# Patient Record
Sex: FEMALE | Race: WHITE | Employment: OTHER | URBAN - METROPOLITAN AREA
[De-identification: names, ages, dates, MRNs, and addresses within clinical notes are randomized per-mention and may not be internally consistent; named-entity substitution may affect disease eponyms.]

---

## 2019-01-25 ENCOUNTER — TELEPHONE (OUTPATIENT)
Dept: PRIMARY CARE CLINIC | Age: 29
End: 2019-01-25

## 2019-01-25 ENCOUNTER — OFFICE VISIT (OUTPATIENT)
Dept: PRIMARY CARE CLINIC | Age: 29
End: 2019-01-25

## 2019-01-25 VITALS
DIASTOLIC BLOOD PRESSURE: 100 MMHG | BODY MASS INDEX: 34.55 KG/M2 | SYSTOLIC BLOOD PRESSURE: 135 MMHG | WEIGHT: 176 LBS | TEMPERATURE: 97.8 F | HEART RATE: 82 BPM | HEIGHT: 60 IN

## 2019-01-25 DIAGNOSIS — J30.9 ALLERGIC RHINITIS, UNSPECIFIED SEASONALITY, UNSPECIFIED TRIGGER: ICD-10-CM

## 2019-01-25 DIAGNOSIS — R03.0 ELEVATED BP WITHOUT DIAGNOSIS OF HYPERTENSION: ICD-10-CM

## 2019-01-25 DIAGNOSIS — H53.8 BLURRED VISION: ICD-10-CM

## 2019-01-25 DIAGNOSIS — F33.1 MODERATE EPISODE OF RECURRENT MAJOR DEPRESSIVE DISORDER (HCC): Primary | ICD-10-CM

## 2019-01-25 DIAGNOSIS — K58.2 IRRITABLE BOWEL SYNDROME WITH BOTH CONSTIPATION AND DIARRHEA: ICD-10-CM

## 2019-01-25 DIAGNOSIS — G43.001 MIGRAINE WITHOUT AURA AND WITH STATUS MIGRAINOSUS, NOT INTRACTABLE: ICD-10-CM

## 2019-01-25 PROCEDURE — 99203 OFFICE O/P NEW LOW 30 MIN: CPT | Performed by: INTERNAL MEDICINE

## 2019-01-25 RX ORDER — ESCITALOPRAM OXALATE 20 MG/1
20 TABLET ORAL DAILY
Qty: 30 TABLET | Refills: 3 | Status: SHIPPED | OUTPATIENT
Start: 2019-01-25 | End: 2019-06-04 | Stop reason: SDUPTHER

## 2019-01-25 RX ORDER — MONTELUKAST SODIUM 10 MG/1
10 TABLET ORAL NIGHTLY
COMMUNITY
End: 2019-01-25 | Stop reason: SDUPTHER

## 2019-01-25 RX ORDER — DICYCLOMINE HCL 20 MG
20 TABLET ORAL EVERY 6 HOURS PRN
Qty: 120 TABLET | Refills: 3 | Status: SHIPPED | OUTPATIENT
Start: 2019-01-25

## 2019-01-25 RX ORDER — MONTELUKAST SODIUM 10 MG/1
10 TABLET ORAL NIGHTLY
Qty: 30 TABLET | Refills: 5 | Status: SHIPPED | OUTPATIENT
Start: 2019-01-25 | End: 2019-12-18 | Stop reason: SDUPTHER

## 2019-01-25 RX ORDER — ZOLMITRIPTAN 5 MG/1
5 TABLET, FILM COATED ORAL PRN
Qty: 6 TABLET | Refills: 5 | Status: SHIPPED | OUTPATIENT
Start: 2019-01-25 | End: 2019-04-25 | Stop reason: ALTCHOICE

## 2019-01-25 ASSESSMENT — ENCOUNTER SYMPTOMS
DIARRHEA: 1
CONSTIPATION: 1

## 2019-01-26 DIAGNOSIS — R03.0 ELEVATED BP WITHOUT DIAGNOSIS OF HYPERTENSION: ICD-10-CM

## 2019-01-26 LAB
A/G RATIO: 1.4 (ref 1.1–2.2)
ALBUMIN SERPL-MCNC: 4.7 G/DL (ref 3.4–5)
ALP BLD-CCNC: 69 U/L (ref 40–129)
ALT SERPL-CCNC: 24 U/L (ref 10–40)
ANION GAP SERPL CALCULATED.3IONS-SCNC: 15 MMOL/L (ref 3–16)
AST SERPL-CCNC: 20 U/L (ref 15–37)
BASOPHILS ABSOLUTE: 0 K/UL (ref 0–0.2)
BASOPHILS RELATIVE PERCENT: 0.3 %
BILIRUB SERPL-MCNC: 0.5 MG/DL (ref 0–1)
BILIRUBIN URINE: NEGATIVE
BLOOD, URINE: NEGATIVE
BUN BLDV-MCNC: 9 MG/DL (ref 7–20)
CALCIUM SERPL-MCNC: 9.9 MG/DL (ref 8.3–10.6)
CHLORIDE BLD-SCNC: 101 MMOL/L (ref 99–110)
CHOLESTEROL, TOTAL: 252 MG/DL (ref 0–199)
CLARITY: ABNORMAL
CO2: 22 MMOL/L (ref 21–32)
COLOR: YELLOW
CREAT SERPL-MCNC: 0.7 MG/DL (ref 0.6–1.1)
EOSINOPHILS ABSOLUTE: 0.3 K/UL (ref 0–0.6)
EOSINOPHILS RELATIVE PERCENT: 4 %
EPITHELIAL CELLS, UA: 8 /HPF (ref 0–5)
GFR AFRICAN AMERICAN: >60
GFR NON-AFRICAN AMERICAN: >60
GLOBULIN: 3.3 G/DL
GLUCOSE BLD-MCNC: 102 MG/DL (ref 70–99)
GLUCOSE URINE: NEGATIVE MG/DL
HCT VFR BLD CALC: 43.9 % (ref 36–48)
HDLC SERPL-MCNC: 38 MG/DL (ref 40–60)
HEMOGLOBIN: 14.8 G/DL (ref 12–16)
HYALINE CASTS: 2 /LPF (ref 0–8)
KETONES, URINE: NEGATIVE MG/DL
LDL CHOLESTEROL CALCULATED: 177 MG/DL
LEUKOCYTE ESTERASE, URINE: NEGATIVE
LYMPHOCYTES ABSOLUTE: 2.7 K/UL (ref 1–5.1)
LYMPHOCYTES RELATIVE PERCENT: 42.5 %
MCH RBC QN AUTO: 31.7 PG (ref 26–34)
MCHC RBC AUTO-ENTMCNC: 33.7 G/DL (ref 31–36)
MCV RBC AUTO: 94.1 FL (ref 80–100)
MICROSCOPIC EXAMINATION: YES
MONOCYTES ABSOLUTE: 0.5 K/UL (ref 0–1.3)
MONOCYTES RELATIVE PERCENT: 8.3 %
NEUTROPHILS ABSOLUTE: 2.9 K/UL (ref 1.7–7.7)
NEUTROPHILS RELATIVE PERCENT: 44.9 %
NITRITE, URINE: NEGATIVE
PDW BLD-RTO: 13.9 % (ref 12.4–15.4)
PH UA: 5
PLATELET # BLD: 372 K/UL (ref 135–450)
PMV BLD AUTO: 8.7 FL (ref 5–10.5)
POTASSIUM SERPL-SCNC: 4.4 MMOL/L (ref 3.5–5.1)
PROTEIN UA: ABNORMAL MG/DL
RBC # BLD: 4.67 M/UL (ref 4–5.2)
RBC UA: 3 /HPF (ref 0–4)
SODIUM BLD-SCNC: 138 MMOL/L (ref 136–145)
SPECIFIC GRAVITY UA: >1.03
TOTAL PROTEIN: 8 G/DL (ref 6.4–8.2)
TRIGL SERPL-MCNC: 187 MG/DL (ref 0–150)
TSH SERPL DL<=0.05 MIU/L-ACNC: 1.89 UIU/ML (ref 0.27–4.2)
URINE TYPE: ABNORMAL
UROBILINOGEN, URINE: 0.2 E.U./DL
VLDLC SERPL CALC-MCNC: 37 MG/DL
WBC # BLD: 6.5 K/UL (ref 4–11)
WBC UA: 5 /HPF (ref 0–5)

## 2019-02-21 ENCOUNTER — TELEPHONE (OUTPATIENT)
Dept: PRIMARY CARE CLINIC | Age: 29
End: 2019-02-21

## 2019-02-26 ENCOUNTER — TELEPHONE (OUTPATIENT)
Dept: PRIMARY CARE CLINIC | Age: 29
End: 2019-02-26

## 2019-04-25 ENCOUNTER — OFFICE VISIT (OUTPATIENT)
Dept: PRIMARY CARE CLINIC | Age: 29
End: 2019-04-25

## 2019-04-25 VITALS
SYSTOLIC BLOOD PRESSURE: 130 MMHG | DIASTOLIC BLOOD PRESSURE: 89 MMHG | OXYGEN SATURATION: 98 % | WEIGHT: 176 LBS | BODY MASS INDEX: 34.55 KG/M2 | TEMPERATURE: 98.2 F | HEART RATE: 73 BPM | HEIGHT: 60 IN

## 2019-04-25 DIAGNOSIS — J45.40 MODERATE PERSISTENT ASTHMA WITHOUT COMPLICATION: ICD-10-CM

## 2019-04-25 DIAGNOSIS — G43.001 MIGRAINE WITHOUT AURA AND WITH STATUS MIGRAINOSUS, NOT INTRACTABLE: Primary | ICD-10-CM

## 2019-04-25 DIAGNOSIS — F33.1 MODERATE EPISODE OF RECURRENT MAJOR DEPRESSIVE DISORDER (HCC): ICD-10-CM

## 2019-04-25 PROCEDURE — 99214 OFFICE O/P EST MOD 30 MIN: CPT | Performed by: INTERNAL MEDICINE

## 2019-04-25 RX ORDER — DICLOFENAC POTASSIUM 50 MG/1
TABLET, FILM COATED ORAL
Qty: 90 TABLET | Refills: 3 | Status: SHIPPED | OUTPATIENT
Start: 2019-04-25

## 2019-04-25 RX ORDER — TOPIRAMATE 50 MG/1
50 TABLET, FILM COATED ORAL 2 TIMES DAILY
Qty: 60 TABLET | Refills: 3 | Status: SHIPPED | OUTPATIENT
Start: 2019-04-25 | End: 2021-09-15 | Stop reason: ALTCHOICE

## 2019-04-25 ASSESSMENT — ENCOUNTER SYMPTOMS
RESPIRATORY NEGATIVE: 1
EYES NEGATIVE: 1
GASTROINTESTINAL NEGATIVE: 1

## 2019-04-25 NOTE — PATIENT INSTRUCTIONS
Start on the Advair Diskus daily as directed for treatment of asthma and to spare use of the rescue inhaler. Continue Lexapro for depression at the current dose. Start on Topamax 50 mg twice a day for migraines and see the neurologist migraine specialist, Dr. Farideh Dixon if no relief. Dicofenac 50 mg up to 3 times a day at the first sign of headache may also help. Patient Education        Migraine Headache: Care Instructions  Your Care Instructions  Migraines are painful, throbbing headaches that often start on one side of the head. They may cause nausea and vomiting and make you sensitive to light, sound, or smell. Without treatment, migraines can last from 4 hours to a few days. Medicines can help prevent migraines or stop them after they have started. Your doctor can help you find which ones work best for you. Follow-up care is a key part of your treatment and safety. Be sure to make and go to all appointments, and call your doctor if you are having problems. It's also a good idea to know your test results and keep a list of the medicines you take. How can you care for yourself at home? · Do not drive if you have taken a prescription pain medicine. · Rest in a quiet, dark room until your headache is gone. Close your eyes, and try to relax or go to sleep. Don't watch TV or read. · Put a cold, moist cloth or cold pack on the painful area for 10 to 20 minutes at a time. Put a thin cloth between the cold pack and your skin. · Use a warm, moist towel or a heating pad set on low to relax tight shoulder and neck muscles. · Have someone gently massage your neck and shoulders. · Take your medicines exactly as prescribed. Call your doctor if you think you are having a problem with your medicine. You will get more details on the specific medicines your doctor prescribes. · Be careful not to take pain medicine more often than the instructions allow.  You could get worse or more frequent headaches when the medicine wears off. To prevent migraines  · Keep a headache diary so you can figure out what triggers your headaches. Avoiding triggers may help you prevent headaches. Record when each headache began, how long it lasted, and what the pain was like. (Was it throbbing, aching, stabbing, or dull?) Write down any other symptoms you had with the headache, such as nausea, flashing lights or dark spots, or sensitivity to bright light or loud noise. Note if the headache occurred near your period. List anything that might have triggered the headache. Triggers may include certain foods (chocolate, cheese, wine) or odors, smoke, bright light, stress, or lack of sleep. · If your doctor has prescribed medicine for your migraines, take it as directed. You may have medicine that you take only when you get a migraine and medicine that you take all the time to help prevent migraines. ? If your doctor has prescribed medicine for when you get a headache, take it at the first sign of a migraine, unless your doctor has given you other instructions. ? If your doctor has prescribed medicine to prevent migraines, take it exactly as prescribed. Call your doctor if you think you are having a problem with your medicine. · Find healthy ways to deal with stress. Migraines are most common during or right after stressful times. Take time to relax before and after you do something that has caused a migraine in the past.  · Try to keep your muscles relaxed by keeping good posture. Check your jaw, face, neck, and shoulder muscles for tension. Try to relax them. When you sit at a desk, change positions often. And make sure to stretch for 30 seconds each hour. · Get plenty of sleep and exercise. · Eat meals on a regular schedule. Avoid foods and drinks that often trigger migraines.  These include chocolate, alcohol (especially red wine and port), aspartame, monosodium glutamate (MSG), and some additives found in foods (such as hot dogs, ashton, cold cuts, aged cheeses, and pickled foods). · Limit caffeine. Don't drink too much coffee, tea, or soda. But don't quit caffeine suddenly. That can also give you migraines. · Do not smoke or allow others to smoke around you. If you need help quitting, talk to your doctor about stop-smoking programs and medicines. These can increase your chances of quitting for good. · If you are taking birth control pills or hormone therapy, talk to your doctor about whether they are triggering your migraines. When should you call for help? Call 911 anytime you think you may need emergency care. For example, call if:    · You have signs of a stroke. These may include:  ? Sudden numbness, paralysis, or weakness in your face, arm, or leg, especially on only one side of your body. ? Sudden vision changes. ? Sudden trouble speaking. ? Sudden confusion or trouble understanding simple statements. ? Sudden problems with walking or balance. ? A sudden, severe headache that is different from past headaches.    Call your doctor now or seek immediate medical care if:    · You have new or worse nausea and vomiting.     · You have a new or higher fever.     · Your headache gets much worse.    Watch closely for changes in your health, and be sure to contact your doctor if:    · You are not getting better after 2 days (48 hours). Where can you learn more? Go to https://Offeramapascualeb.Wickr. org and sign in to your [employer] account. Enter J087 in the [facility] box to learn more about \"Migraine Headache: Care Instructions. \"     If you do not have an account, please click on the \"Sign Up Now\" link. Current as of: Jackie 3, 2018  Content Version: 11.9  © 2103-7879 [employer]. Care instructions adapted under license by [facility] ([facility]).  If you have questions about a medical condition or this instruction, always ask your healthcare professional. Yariel Lopez any warranty or liability for adults. You should not use diclofenac if you are allergic to it, or if you have ever had an asthma attack or severe allergic reaction after taking aspirin or an NSAID. Do not use Cambia to treat a cluster headache. Do not use Zipsor if you are allergic to beef or beef protein. To make sure diclofenac is safe for you, tell your doctor if you have:  · heart disease, high blood pressure, high cholesterol, diabetes, or if you smoke;  · a history of heart attack, stroke, or blood clot;  · a history of stomach ulcers or bleeding;  · asthma;  · liver or kidney disease;  · fluid retention. Taking diclofenac during the last 3 months of pregnancy may harm the unborn baby. Tell your doctor if you are pregnant or plan to become pregnant. It is not known whether diclofenac passes into breast milk or if it could harm a nursing baby. You should not breast-feed while using this medicine. Diclofenac is not approved for use by anyone younger than 25years old. How should I take diclofenac? Different brands of diclofenac contain different amounts of this medicine, and may have different uses. If you switch brands, your dose needs may change. Follow your doctor's instructions about how much medicine to take. Ask your pharmacist if you have any questions about the brand of diclofenac you receive at the pharmacy. Follow all directions on your prescription label. Your doctor may occasionally change your dose. Do not take this medicine in larger amounts or for longer than recommended. Use the lowest dose that is effective in treating your condition. Take Zorvolex on an empty stomach, at least 1 hour before or 2 hours after a meal.  Do not crush, chew, or break an extended-release tablet or delayed-release tablet. Swallow it whole. Dissolve diclofenac powder (Cambia) with 1 to 2 ounces of water. Do not use any other type of liquid. Stir this mixture and drink all of it right away.  Diclofenac powder works best if you take it on an empty stomach. Call your doctor if your headache does not completely go away after taking Cambia. Do not take a second dose of diclofenac powder without your doctor's advice. Overuse of migraine headache medicine can make headaches worse. Tell your doctor if the medicine seems to stop working as well in treating your migraine attacks. If you use diclofenac long-term, you may need frequent medical tests. Store at room temperature away from moisture and heat. Keep the bottle tightly closed when not in use. Read all patient information, medication guides, and instruction sheets provided to you. Ask your doctor or pharmacist if you have any questions. What happens if I miss a dose? Take the missed dose as soon as you remember. Skip the missed dose if it is almost time for your next scheduled dose. Do not take extra medicine to make up the missed dose. What happens if I overdose? Seek emergency medical attention or call the Poison Help line at 1-725.358.8046. What should I avoid while taking diclofenac? Avoid drinking alcohol. It may increase your risk of stomach bleeding. Avoid taking aspirin or other NSAIDs while you are taking diclofenac. Ask a doctor or pharmacist before using any cold, allergy, or pain medication. Many medicines available over the counter contain aspirin or other medicines similar to diclofenac. Taking certain products together can cause you to get too much of this type of medication. Check the label to see if a medicine contains aspirin, ibuprofen, ketoprofen, or naproxen. What are the possible side effects of diclofenac? Get emergency medical help if you have signs of an allergic reaction: sneezing, runny or stuffy nose; wheezing or trouble breathing; hives; swelling of your face, lips, tongue, or throat.   Get emergency medical help if you have signs of a heart attack or stroke: chest pain spreading to your jaw or shoulder, sudden numbness or weakness on one side of the body, slurred speech, feeling short of breath. Stop using diclofenac and call your doctor at once if you have:  · the first sign of any skin rash, no matter how mild;  · shortness of breath (even with mild exertion); · swelling or rapid weight gain;  · signs of stomach bleeding --bloody or tarry stools, coughing up blood or vomit that looks like coffee grounds;  · liver problems --nausea, upper stomach pain, itching, tired feeling, flu-like symptoms, loss of appetite, dark urine, latrice-colored stools, jaundice (yellowing of the skin or eyes);  · kidney problems --little or no urinating, painful or difficult urination, swelling in your feet or ankles, feeling tired or short of breath;  · high blood pressure --severe headache, pounding in your neck or ears, nosebleed, anxiety, confusion;  · low red blood cells (anemia) --pale skin, feeling light-headed or short of breath, rapid heart rate, trouble concentrating; or  · severe skin reaction --fever, sore throat, swelling in your face or tongue, burning in your eyes, skin pain followed by a red or purple skin rash that spreads (especially in the face or upper body) and causes blistering and peeling. Common side effects may include:  · indigestion, gas, stomach pain, nausea, vomiting;  · diarrhea, constipation;  · headache, dizziness, drowsiness;  · stuffy nose;  · itching, increased sweating;  · increased blood pressure; or  · swelling or pain in your arms or legs. This is not a complete list of side effects and others may occur. Call your doctor for medical advice about side effects. You may report side effects to FDA at 3-937-FDA-8888. What other drugs will affect diclofenac? Ask your doctor before using diclofenac if you take an antidepressant such as citalopram, escitalopram, fluoxetine (Prozac), fluvoxamine, paroxetine, sertraline (Zoloft), trazodone, or vilazodone. Taking any of these medicines with an NSAID may cause you to bruise or bleed easily.   Tell your doctor about all your current medicines and any you start or stop using, especially:  · cyclosporine;  · lithium;  · methotrexate;  · rifampin;  · antifungal medicine;  · a blood thinner (warfarin, Coumadin, Jantoven);  · heart or blood pressure medication, including a diuretic or \"water pill\";  · other forms of diclofenac (Flector, Pennsaid, Solaraze, Voltaren Gel);  · other NSAIDs --aspirin, ibuprofen (Advil, Motrin), naproxen (Aleve), celecoxib (Celebrex), indomethacin, meloxicam, and others; or  · steroid medicine (prednisone and others). This list is not complete. Other drugs may interact with diclofenac, including prescription and over-the-counter medicines, vitamins, and herbal products. Not all possible interactions are listed in this medication guide. Where can I get more information? Your pharmacist can provide more information about diclofenac. Remember, keep this and all other medicines out of the reach of children, never share your medicines with others, and use this medication only for the indication prescribed. Every effort has been made to ensure that the information provided by [address] is accurate, up-to-date, and complete, but no guarantee is made to that effect. Drug information contained herein may be time sensitive. [facility] information has been compiled for use by healthcare practitioners and consumers in the [facility] and therefore [facility] does not warrant that uses outside of the [facility] are appropriate, unless specifically indicated otherwise. [facility]'s drug information does not endorse drugs, diagnose patients or recommend therapy. [facility] drug information is an informational resource designed to assist licensed healthcare practitioners in caring for their patients and/or to serve consumers viewing this service as a supplement to, and not a substitute for, the expertise, skill, knowledge and judgment of healthcare practitioners.  The absence of a warning for a given drug or drug combination in no way should be construed to indicate that the drug or drug combination is safe, effective or appropriate for any given patient. [facility] does not assume any responsibility for any aspect of healthcare administered with the aid of information [facility] provides. The information contained herein is not intended to cover all possible uses, directions, precautions, warnings, drug interactions, allergic reactions, or adverse effects. If you have questions about the drugs you are taking, check with your doctor, nurse or pharmacist.  Copyright 2117-3891 [address]: 15.01. Revision date: 3/23/2017. Care instructions adapted under license by [facility] ([facility]). If you have questions about a medical condition or this instruction, always ask your healthcare professional. [place] [address] any warranty or liability for your use of this information. Patient Education        topiramate  Pronunciation:  toe HAO a mate  Brand:  Qudexy XR Sprinkle, Topamax, Topamax Sprinkle, Trokendi XR  What is the most important information I should know about topiramate? Topiramate may cause vision problems that can be permanent if not treated quickly. Call your doctor right away if you have a sudden decrease in vision. Topiramate can decrease sweating and may cause life-threatening dehydration (especially in children). Avoid becoming overheated or dehydrated. Tell your doctor if you have decreased sweating, high fever, and hot dry skin. Do not take Trokendi XR within 6 hours of drinking alcohol. Some people have thoughts about suicide while taking seizure medicine. Stay alert to changes in your mood or symptoms. Report any new or worsening symptoms to your doctor. Do not stop using topiramate suddenly or you could have increased seizures. What is topiramate? Topiramate is a seizure medicine, also called an anticonvulsant.  Topiramate is used to treat seizures in adults and children who are at least 3years old. Trokendi XR is for use in adults and children who are at least 10years old. Extended-release topiramate has a higher minimum age (at least 8years old) when used as the child's only seizure medicine. Some brands of topiramate are also used to prevent migraine headaches in adults and teenagers who are at least 15years old. These medicines will only prevent  migraine headaches or reduce the number of attacks, but will not treat a headache that has already begun. Topiramate may also be used for purposes not listed in this medication guide. What should I discuss with my healthcare provider before taking topiramate? You should not use topiramate if you are allergic to it. Do not take Trokendi XR within 6 hours before or 6 hours after drinking alcohol. You should not use extended-release topiramate if you have metabolic acidosis (high levels of acid in your blood) and are also taking metformin for diabetes. Tell your doctor if you have ever had:  · glaucoma or other eye problems;  · diabetes, or metabolic acidosis;  · kidney disease, kidney stones, or dialysis;  · severe breathing problems;  · mood problems, depression, or suicidal thoughts or actions;  · liver disease;  · soft or brittle bones (osteoporosis, osteomalacia);  · a growth disorder; or  · if you are sick with diarrhea. Topiramate can increase the level of acid in your blood (metabolic acidosis). This can weaken your bones, cause kidney stones, or cause growth problems in children or harm to an unborn baby. You may need blood tests to make sure you do not have metabolic acidosis, especially if you are pregnant. Some people have thoughts about suicide while taking an anticonvulsant. Your doctor should check your progress at regular visits. Your family or other caregivers should also be alert to changes in your mood or symptoms.   Do not start or stop taking topiramate during pregnancy without your doctor's advice. Topiramate may increase the risk of low birth weight and cleft lip and/or cleft palate in a . There may be other seizure medicine that can be more safely used during pregnancy. Tell your doctor right away if you become pregnant. Topiramate can make birth control pills less effective. Use a barrier form of birth control (such as a condom or diaphragm with spermicide) to prevent pregnancy while taking topiramate. It may not be safe to breast-feed a baby while you are using this medicine. Ask your doctor about any risks. How should I take topiramate? Follow all directions on your prescription label and read all medication guides or instruction sheets. Your doctor may occasionally change your dose. Use the medicine exactly as directed. Topiramate can be taken with or without food. Swallow the tablet  whole and do not crush, chew, or break it. The Trokendi XR extended-release capsule must be swallowed whole. Do not break or open. If you cannot swallow a Qudexy XR or Topamax Sprinkle Capsule whole, open it and sprinkle the medicine into a spoonful of applesauce or other soft food. Swallow the mixture right away without chewing. Do not save it for later use. Carefully follow the swallowing instructions for your medicine. If a child is using this medicine, tell your doctor if the child has any changes in weight. Topiramate doses are based on weight in children, and any changes may affect your child's dose. Drink plenty of liquids while you are taking topiramate, to prevent kidney stones or an electrolyte imbalance. You will need frequent medical tests. If you need surgery, tell the surgeon ahead of time that you are using topiramate. Any medical care provider who treats you should know that you take seizure medication. Do not stop using topiramate suddenly, even if you feel fine. Stopping suddenly may cause increased seizures. Follow your doctor's instructions about tapering your dose.   Call your doctor if your seizures get worse or you have them more often while taking topiramate. Store at cool room temperature away from moisture, light, and high heat. Keep the bottle tightly closed when not in use. What happens if I miss a dose? Take the medicine as soon as you can, but skip the missed dose if you are more than 6 hours late for the dose. Do not take two doses at one time. Call your doctor for instructions if you miss two or more doses. What happens if I overdose? Seek emergency medical attention or call the Poison Help line at 1-261.972.2799. Overdose symptoms may include drowsiness, agitation, depression, double vision, thinking problems, problems with speech or coordination, fainting, and seizure (convulsions). What should I avoid while taking topiramate? Do not drink alcohol. Dangerous side effects or increased seizures may occur. Avoid becoming overheated or dehydrated in hot weather. Topiramate can decrease sweating and increase body temperature, leading to life-threatening dehydration (especially in children). Ketogenic or \"ketosis\" diets that are high in fat and low in carbohydrates can increase the risk of kidney stones. Avoid the use of such diets while you are taking topiramate. Topiramate may cause blurred vision or impair your thinking or reactions. Avoid driving or operating machinery until you know how this medicine will affect you. Also avoid activities that could be dangerous if you have an unexpected seizure, such as swimming or climbing in high places. What are the possible side effects of topiramate? Get emergency medical help if you have signs of an allergic reaction: hives; difficult breathing; swelling of your face, lips, tongue, or throat.   Report any new or worsening mood symptoms to your doctor, such as: mood or behavior changes, anxiety, panic attacks, trouble sleeping, or if you feel impulsive, irritable, agitated, hostile, aggressive, restless, hyperactive (mentally or physically), depressed, or have thoughts about suicide or hurting yourself. Call your doctor at once if you have:  · vision problems, eye pain or redness, sudden vision loss (can be permanent if not treated quickly);  · confusion, problems with thinking or memory, trouble concentrating, problems with speech;  · dehydration symptoms --decreased sweating, high fever, hot and dry skin;  · signs of a kidney stone --severe pain in your side or lower back, painful or difficult urination;  · signs of too much acid in your blood --irregular heartbeats, feeling tired, loss of appetite, trouble thinking, feeling short of breath; or  · signs of too much ammonia in your blood --vomiting, unexplained weakness, feeling like you might pass out. Common side effects may include:  · fever, weight loss;  · numbness or tingling in your arms and legs;  · flushing (warmth, redness, or tingly feeling);  · headache, dizziness, drowsiness, tired feeling, slow reactions;  · mood problems, feeling nervous;  · nausea, diarrhea, stomach pain, loss of appetite, indigestion;  · cold symptoms such as stuffy nose, sneezing, sore throat; or  · changes in your sense of taste. This is not a complete list of side effects and others may occur. Call your doctor for medical advice about side effects. You may report side effects to FDA at 9-961-FDA-6124. What other drugs will affect topiramate? Using topiramate with other drugs that make you drowsy can worsen this effect. Ask your doctor before using opioid medication, a sleeping pill, a muscle relaxer, or medicine for anxiety or depression. Tell your doctor about all your other medicines, especially:  · lithium;  · metformin;  · birth control pills;  · glaucoma medication; or  · other seizure medicines --carbamazepine, divalproex sodium, phenytoin, valproic acid, or zonisamide. This list is not complete.  Other drugs may affect topiramate, including prescription and over-the-counter medicines, vitamins, and herbal products. Not all possible drug interactions are listed here. Where can I get more information? Your pharmacist can provide more information about topiramate. Remember, keep this and all other medicines out of the reach of children, never share your medicines with others, and use this medication only for the indication prescribed. Every effort has been made to ensure that the information provided by [address] is accurate, up-to-date, and complete, but no guarantee is made to that effect. Drug information contained herein may be time sensitive. [facility] information has been compiled for use by healthcare practitioners and consumers in the [facility] and therefore [facility] does not warrant that uses outside of the [facility] are appropriate, unless specifically indicated otherwise. [facility]'s drug information does not endorse drugs, diagnose patients or recommend therapy. [facility]'s drug information is an informational resource designed to assist licensed healthcare practitioners in caring for their patients and/or to serve consumers viewing this service as a supplement to, and not a substitute for, the expertise, skill, knowledge and judgment of healthcare practitioners. The absence of a warning for a given drug or drug combination in no way should be construed to indicate that the drug or drug combination is safe, effective or appropriate for any given patient. [facility] does not assume any responsibility for any aspect of healthcare administered with the aid of information [facility] provides. The information contained herein is not intended to cover all possible uses, directions, precautions, warnings, drug interactions, allergic reactions, or adverse effects. If you have questions about the drugs you are taking, check with your doctor, nurse or pharmacist.  Copyright 0789-2104 [address]. Version: 16.01. Revision date: 3/21/2018.   Care instructions adapted under license by [facility] [address] If you have questions about a medical condition or this instruction, always ask your healthcare professional. [place] [address] any warranty or liability for your use of this information.

## 2019-04-25 NOTE — PROGRESS NOTES
marley Conti  YOB: 1990    Date of Service:  4/25/2019    Chief Complaint   Patient presents with    Depression    Headache     History of depression and migraine headache. The depression is better on Lexapro. The switch to Zomig did not reduce her headaches. Migraine    This is a chronic problem. The current episode started more than 1 year ago. The problem occurs daily. The problem has been unchanged. The pain does not radiate. The pain quality is similar to prior headaches. The quality of the pain is described as aching. The pain is at a severity of 5/10. The pain is moderate. Seizures: other. The treatment provided mild relief. Her past medical history is significant for migraine headaches. Other   This is a chronic (depression) problem. The current episode started more than 1 year ago. The problem occurs intermittently. The problem has been rapidly improving. Nothing aggravates the symptoms. Treatments tried: lexapro. No Known Allergies  Outpatient Medications Marked as Taking for the 4/25/19 encounter (Office Visit) with Te Leonard MD   Medication Sig Dispense Refill    VENTOLIN  (90 Base) MCG/ACT inhaler Inhale 2 puffs into the lungs every 4 hours as needed for Wheezing 1 Inhaler 5    montelukast (SINGULAIR) 10 MG tablet Take 1 tablet by mouth nightly 30 tablet 5    escitalopram (LEXAPRO) 20 MG tablet Take 1 tablet by mouth daily 30 tablet 3    ZOLMitriptan (ZOMIG) 5 MG tablet Take 1 tablet by mouth as needed for Migraine 6 tablet 5    dicyclomine (BENTYL) 20 MG tablet Take 1 tablet by mouth every 6 hours as needed (IBS symptoms) 120 tablet 3         There is no immunization history on file for this patient. No past medical history on file. No past surgical history on file. No family history on file. Review of Systems:  Review of Systems   Constitutional: Negative. HENT: Negative. Eyes: Negative. Respiratory: Negative.  AST 01/26/2019 20     Globulin 01/26/2019 3.3     WBC 01/26/2019 6.5     RBC 01/26/2019 4.67     Hemoglobin 01/26/2019 14.8     Hematocrit 01/26/2019 43.9     MCV 01/26/2019 94.1     MCH 01/26/2019 31.7     MCHC 01/26/2019 33.7     RDW 01/26/2019 13.9     Platelets 98/99/1999 372     MPV 01/26/2019 8.7     Neutrophils % 01/26/2019 44.9     Lymphocytes % 01/26/2019 42.5     Monocytes % 01/26/2019 8.3     Eosinophils % 01/26/2019 4.0     Basophils % 01/26/2019 0.3     Neutrophils # 01/26/2019 2.9     Lymphocytes # 01/26/2019 2.7     Monocytes # 01/26/2019 0.5     Eosinophils # 01/26/2019 0.3     Basophils # 01/26/2019 0.0     Cholesterol, Total 01/26/2019 252*    Triglycerides 01/26/2019 187*    HDL 01/26/2019 38*    LDL Calculated 01/26/2019 177*    VLDL Cholesterol Calcula* 01/26/2019 37     TSH 01/26/2019 1.89     Color, UA 01/26/2019 YELLOW     Clarity, UA 01/26/2019 CLOUDY*    Glucose, Ur 01/26/2019 Negative     Bilirubin Urine 01/26/2019 Negative     Ketones, Urine 01/26/2019 Negative     Specific Gravity, UA 01/26/2019 >1.030     Blood, Urine 01/26/2019 Negative     pH, UA 01/26/2019 5.0     Protein, UA 01/26/2019 TRACE*    Urobilinogen, Urine 01/26/2019 0.2     Nitrite, Urine 01/26/2019 Negative     Leukocyte Esterase, Urine 01/26/2019 Negative     Microscopic Examination 01/26/2019 YES     Urine Type 01/26/2019 Not Specified     Hyaline Casts, UA 01/26/2019 2     WBC, UA 01/26/2019 5     RBC, UA 01/26/2019 3     Epi Cells 41/33/4409 8*     notapplicable      Health Maintenance   Topic Date Due    Varicella Vaccine (1 of 2 - 13+ 2-dose series) 06/12/2003    HIV screen  06/12/2005    DTaP/Tdap/Td vaccine (1 - Tdap) 06/12/2009    Cervical cancer screen  06/12/2011    Flu vaccine (Season Ended) 09/01/2019    Pneumococcal 0-64 years Vaccine  Aged Out          Assessment/Plan:     Moderate episode of recurrent major depressive disorder (HCC)  Improved Migraine without aura and with status migrainosus, not intractable  Switch to topamax for prevention and diclofenac for treatment . Referral to neurology for ongoing care. There are no diagnoses linked to this encounter. No follow-ups on file.

## 2019-06-04 ENCOUNTER — TELEPHONE (OUTPATIENT)
Dept: PRIMARY CARE CLINIC | Age: 29
End: 2019-06-04

## 2019-06-04 DIAGNOSIS — J30.9 ALLERGIC RHINITIS, UNSPECIFIED SEASONALITY, UNSPECIFIED TRIGGER: ICD-10-CM

## 2019-06-04 RX ORDER — ESCITALOPRAM OXALATE 20 MG/1
20 TABLET ORAL DAILY
Qty: 30 TABLET | Refills: 3 | Status: SHIPPED | OUTPATIENT
Start: 2019-06-04 | End: 2019-09-05 | Stop reason: ALTCHOICE

## 2019-06-04 NOTE — TELEPHONE ENCOUNTER
NOTE:   Please take out [facility] and add the following:    KALEIGH TAPIA [address] [facility], [place], [address] [place] [address]     PHONE:   [phone]    Jassi said pt is now filling scripts with them.   Thanks

## 2019-06-04 NOTE — TELEPHONE ENCOUNTER
Patient requesting a medication refill.   Medication: Escitalopram 20 mg  Pharmacy: [facility]. [address] [facility], [address] [facility] [phone]  Last office visit: 4/25/2019  Next office visit: 7/25/2019

## 2019-07-25 ENCOUNTER — OFFICE VISIT (OUTPATIENT)
Dept: PRIMARY CARE CLINIC | Age: 29
End: 2019-07-25
Payer: COMMERCIAL

## 2019-07-25 VITALS
WEIGHT: 180 LBS | BODY MASS INDEX: 35.34 KG/M2 | DIASTOLIC BLOOD PRESSURE: 82 MMHG | HEART RATE: 78 BPM | OXYGEN SATURATION: 98 % | TEMPERATURE: 98.2 F | HEIGHT: 60 IN | SYSTOLIC BLOOD PRESSURE: 129 MMHG

## 2019-07-25 DIAGNOSIS — K21.00 GASTROESOPHAGEAL REFLUX DISEASE WITH ESOPHAGITIS: ICD-10-CM

## 2019-07-25 DIAGNOSIS — B07.0 PLANTAR WART, RIGHT FOOT: ICD-10-CM

## 2019-07-25 DIAGNOSIS — K58.2 IRRITABLE BOWEL SYNDROME WITH BOTH CONSTIPATION AND DIARRHEA: Primary | ICD-10-CM

## 2019-07-25 DIAGNOSIS — F33.1 MODERATE EPISODE OF RECURRENT MAJOR DEPRESSIVE DISORDER (HCC): ICD-10-CM

## 2019-07-25 PROCEDURE — 99214 OFFICE O/P EST MOD 30 MIN: CPT | Performed by: INTERNAL MEDICINE

## 2019-07-25 RX ORDER — LINACLOTIDE 290 UG/1
1 CAPSULE, GELATIN COATED ORAL DAILY
Qty: 30 CAPSULE | Refills: 3 | Status: SHIPPED | OUTPATIENT
Start: 2019-07-25 | End: 2019-09-05 | Stop reason: SDUPTHER

## 2019-07-25 RX ORDER — OMEPRAZOLE 20 MG/1
20 CAPSULE, DELAYED RELEASE ORAL DAILY
Qty: 30 CAPSULE | Refills: 3 | Status: SHIPPED | OUTPATIENT
Start: 2019-07-25 | End: 2021-09-15 | Stop reason: SDUPTHER

## 2019-07-25 ASSESSMENT — ENCOUNTER SYMPTOMS
RESPIRATORY NEGATIVE: 1
DIARRHEA: 1
EYES NEGATIVE: 1
ABDOMINAL PAIN: 1

## 2019-09-05 ENCOUNTER — OFFICE VISIT (OUTPATIENT)
Dept: PRIMARY CARE CLINIC | Age: 29
End: 2019-09-05
Payer: COMMERCIAL

## 2019-09-05 VITALS
BODY MASS INDEX: 36.12 KG/M2 | WEIGHT: 184 LBS | SYSTOLIC BLOOD PRESSURE: 113 MMHG | TEMPERATURE: 98.8 F | OXYGEN SATURATION: 100 % | HEART RATE: 77 BPM | HEIGHT: 60 IN | DIASTOLIC BLOOD PRESSURE: 79 MMHG

## 2019-09-05 DIAGNOSIS — F33.1 MODERATE EPISODE OF RECURRENT MAJOR DEPRESSIVE DISORDER (HCC): ICD-10-CM

## 2019-09-05 DIAGNOSIS — K58.2 IRRITABLE BOWEL SYNDROME WITH BOTH CONSTIPATION AND DIARRHEA: ICD-10-CM

## 2019-09-05 DIAGNOSIS — G89.29 CHRONIC RECTAL PAIN: ICD-10-CM

## 2019-09-05 DIAGNOSIS — J30.9 ALLERGIC RHINITIS, UNSPECIFIED SEASONALITY, UNSPECIFIED TRIGGER: Primary | ICD-10-CM

## 2019-09-05 DIAGNOSIS — K62.89 CHRONIC RECTAL PAIN: ICD-10-CM

## 2019-09-05 PROCEDURE — G8417 CALC BMI ABV UP PARAM F/U: HCPCS | Performed by: INTERNAL MEDICINE

## 2019-09-05 PROCEDURE — 4004F PT TOBACCO SCREEN RCVD TLK: CPT | Performed by: INTERNAL MEDICINE

## 2019-09-05 PROCEDURE — 99214 OFFICE O/P EST MOD 30 MIN: CPT | Performed by: INTERNAL MEDICINE

## 2019-09-05 PROCEDURE — G8427 DOCREV CUR MEDS BY ELIG CLIN: HCPCS | Performed by: INTERNAL MEDICINE

## 2019-09-05 RX ORDER — LINACLOTIDE 290 UG/1
1 CAPSULE, GELATIN COATED ORAL DAILY
Qty: 30 CAPSULE | Refills: 3 | Status: SHIPPED | OUTPATIENT
Start: 2019-09-05 | End: 2021-09-15 | Stop reason: SDUPTHER

## 2019-09-05 RX ORDER — FLUOXETINE HYDROCHLORIDE 20 MG/1
40 CAPSULE ORAL DAILY
Qty: 60 CAPSULE | Refills: 5 | Status: SHIPPED | OUTPATIENT
Start: 2019-09-05 | End: 2021-09-15 | Stop reason: ALTCHOICE

## 2019-09-05 RX ORDER — LORATADINE 10 MG/1
10 TABLET ORAL DAILY
Qty: 30 TABLET | Refills: 5 | Status: SHIPPED | OUTPATIENT
Start: 2019-09-05 | End: 2020-06-25 | Stop reason: SDUPTHER

## 2019-09-05 ASSESSMENT — ENCOUNTER SYMPTOMS
RESPIRATORY NEGATIVE: 1
ABDOMINAL PAIN: 1
EYES NEGATIVE: 1

## 2019-09-05 NOTE — PROGRESS NOTES
pro Juanita Cheema  YOB: 1990    Date of Service:  9/5/2019    Chief Complaint   Patient presents with    Irritable Bowel Syndrome         Abdominal Pain   Chronicity: hx of IBS but had trouble getting Linzess before and will rewrite for current pharmacy today. The current episode started more than 1 month ago. The onset quality is gradual. The problem occurs 2 to 4 times per day. The problem has been unchanged. The pain is located in the epigastric region. The pain is at a severity of 5/10. The pain is moderate. The quality of the pain is cramping. The abdominal pain does not radiate. Nothing aggravates the pain. The treatment provided no relief. Other   Chronicity: Depression. Current medication is no longer working. Started on 40 mg of Prozac daily. The current episode started more than 1 year ago. The problem occurs constantly. The problem has been gradually worsening. Associated symptoms include abdominal pain. The symptoms are aggravated by stress. Treatments tried: Lexapro. The treatment provided moderate relief. No Known Allergies  Outpatient Medications Marked as Taking for the 9/5/19 encounter (Office Visit) with Yue Faust MD   Medication Sig Dispense Refill    loratadine (CLARITIN) 10 MG tablet Take 1 tablet by mouth daily 30 tablet 5    LINZESS 290 MCG CAPS capsule Take 1 capsule by mouth daily 30 capsule 3    hydrocortisone-pramoxine (PROCTOFOAM HC) 1-1 % rectal foam Place rectally 2 times daily.  10 g 3    FLUoxetine (PROZAC) 20 MG capsule Take 2 capsules by mouth daily 60 capsule 5    omeprazole (PRILOSEC) 20 MG delayed release capsule Take 1 capsule by mouth daily 30 capsule 3    topiramate (TOPAMAX) 50 MG tablet Take 1 tablet by mouth 2 times daily 60 tablet 3    fluticasone-salmeterol (ADVAIR DISKUS) 500-50 MCG/DOSE diskus inhaler Inhale 1 puff into the lungs every 12 hours 60 each 11    diclofenac (CATAFLAM) 50 MG tablet Take one tablet up to 3 times

## 2019-09-05 NOTE — ASSESSMENT & PLAN NOTE
Symptoms improved somewhat initially but have now worsened on the current medication. Switch to Prozac 40 mg a day.   Follow-up with the psychiatric CNP

## 2019-12-18 RX ORDER — MONTELUKAST SODIUM 10 MG/1
10 TABLET ORAL NIGHTLY
Qty: 30 TABLET | Refills: 0 | Status: SHIPPED | OUTPATIENT
Start: 2019-12-18 | End: 2020-01-30

## 2020-01-30 RX ORDER — MONTELUKAST SODIUM 10 MG/1
TABLET ORAL
Qty: 30 TABLET | Refills: 0 | Status: SHIPPED | OUTPATIENT
Start: 2020-01-30 | End: 2020-03-24

## 2020-03-24 RX ORDER — MONTELUKAST SODIUM 10 MG/1
TABLET ORAL
Qty: 30 TABLET | Refills: 0 | Status: SHIPPED | OUTPATIENT
Start: 2020-03-24 | End: 2020-06-25 | Stop reason: SDUPTHER

## 2020-06-25 RX ORDER — MONTELUKAST SODIUM 10 MG/1
TABLET ORAL
Qty: 30 TABLET | Refills: 4 | Status: SHIPPED | OUTPATIENT
Start: 2020-06-25 | End: 2021-09-15 | Stop reason: SDUPTHER

## 2020-06-25 RX ORDER — LORATADINE 10 MG/1
TABLET ORAL
Qty: 30 TABLET | Refills: 4 | Status: SHIPPED | OUTPATIENT
Start: 2020-06-25 | End: 2021-09-15 | Stop reason: SDUPTHER

## 2020-12-04 NOTE — TELEPHONE ENCOUNTER
Medication:   Requested Prescriptions     Pending Prescriptions Disp Refills    VENTOLIN  (90 Base) MCG/ACT inhaler [Pharmacy Med Name: Ventolin HFA Inhalation Aerosol Solution 108 (90 Base) MCG/ACT] 18 g 0     Sig: INHALE 2 PUFFS ORALLY EVERY FOUR HOURS AS NEEDED for wheezing        Last Filled:      Patient Phone Number: [phone] (home)     Last appt: 9/5/2019   Next appt: 12/4/2020    Last OARRS: No flowsheet data found.

## 2021-08-08 DIAGNOSIS — F33.1 MODERATE EPISODE OF RECURRENT MAJOR DEPRESSIVE DISORDER (HCC): ICD-10-CM

## 2021-08-09 RX ORDER — MONTELUKAST SODIUM 10 MG/1
TABLET ORAL
Qty: 30 TABLET | Refills: 0 | OUTPATIENT
Start: 2021-08-09

## 2021-08-09 NOTE — TELEPHONE ENCOUNTER
Medication:   Requested Prescriptions     Pending Prescriptions Disp Refills    VENTOLIN  (90 Base) MCG/ACT inhaler [Pharmacy Med Name: Ventolin HFA Inhalation Aerosol Solution 108 (90 Base) MCG/ACT] 18 g 0     Sig: INHALE 2 PUFFS ORALLY EVERY 4 HOURS AS NEEDED FOR WHEEZING    montelukast (SINGULAIR) 10 MG tablet [Pharmacy Med Name: Montelukast Sodium Oral Tablet 10 MG] 30 tablet 0     Sig: TAKE 1 TABLET BY MOUTH AT night        Last Filled:      Patient Phone Number: [phone] (home)     Last appt: 9/5/2019   Next appt: Visit date not found    Last OARRS: No flowsheet data found.

## 2021-08-11 ENCOUNTER — TELEPHONE (OUTPATIENT)
Dept: PRIMARY CARE CLINIC | Age: 31
End: 2021-08-11

## 2021-08-11 NOTE — TELEPHONE ENCOUNTER
Patient was informed that Dr Lisseth Fischer will fill her ventolin. She also wants Dr Lisseth Fischer to review her medical history and evaluate whether it's ok for her to get the Covid vaccine. Please call and advise.

## 2021-08-12 NOTE — TELEPHONE ENCOUNTER
I reviewed the patient's chart. She does not have any medical contraindications. She should be able to safely receive the COVID-19 vaccine. I am urging her to get the vaccine as soon as possible.

## 2021-09-13 ENCOUNTER — TELEPHONE (OUTPATIENT)
Dept: PRIMARY CARE CLINIC | Age: 31
End: 2021-09-13

## 2021-09-13 NOTE — TELEPHONE ENCOUNTER
----- Message from Can Garcia sent at 9/13/2021  2:24 PM EDT -----  Subject: Message to Provider    QUESTIONS  Information for Provider?  needed for ov on 9/14  ---------------------------------------------------------------------------  --------------  CALL BACK INFO  What is the best way for the office to contact you? OK to leave message on   voicemail  Preferred Call Back Phone Number? [phone]  ---------------------------------------------------------------------------  --------------  SCRIPT ANSWERS  Relationship to Patient?  Self

## 2021-09-15 ENCOUNTER — OFFICE VISIT (OUTPATIENT)
Dept: PRIMARY CARE CLINIC | Age: 31
End: 2021-09-15
Payer: MEDICAID

## 2021-09-15 VITALS
TEMPERATURE: 98 F | HEART RATE: 92 BPM | BODY MASS INDEX: 34.16 KG/M2 | OXYGEN SATURATION: 96 % | DIASTOLIC BLOOD PRESSURE: 91 MMHG | WEIGHT: 174 LBS | HEIGHT: 60 IN | SYSTOLIC BLOOD PRESSURE: 136 MMHG

## 2021-09-15 DIAGNOSIS — G43.001 MIGRAINE WITHOUT AURA AND WITH STATUS MIGRAINOSUS, NOT INTRACTABLE: ICD-10-CM

## 2021-09-15 DIAGNOSIS — K21.00 GASTROESOPHAGEAL REFLUX DISEASE WITH ESOPHAGITIS WITHOUT HEMORRHAGE: ICD-10-CM

## 2021-09-15 DIAGNOSIS — B07.0 PLANTAR WART, RIGHT FOOT: ICD-10-CM

## 2021-09-15 DIAGNOSIS — J45.40 MODERATE PERSISTENT ASTHMA WITHOUT COMPLICATION: ICD-10-CM

## 2021-09-15 DIAGNOSIS — K58.2 IRRITABLE BOWEL SYNDROME WITH BOTH CONSTIPATION AND DIARRHEA: Primary | ICD-10-CM

## 2021-09-15 DIAGNOSIS — J30.9 ALLERGIC RHINITIS, UNSPECIFIED SEASONALITY, UNSPECIFIED TRIGGER: ICD-10-CM

## 2021-09-15 DIAGNOSIS — L84 HEEL CALLUS: ICD-10-CM

## 2021-09-15 DIAGNOSIS — R03.0 ELEVATED BP WITHOUT DIAGNOSIS OF HYPERTENSION: ICD-10-CM

## 2021-09-15 DIAGNOSIS — F33.1 MODERATE EPISODE OF RECURRENT MAJOR DEPRESSIVE DISORDER (HCC): ICD-10-CM

## 2021-09-15 PROCEDURE — G8427 DOCREV CUR MEDS BY ELIG CLIN: HCPCS | Performed by: INTERNAL MEDICINE

## 2021-09-15 PROCEDURE — 99215 OFFICE O/P EST HI 40 MIN: CPT | Performed by: INTERNAL MEDICINE

## 2021-09-15 PROCEDURE — G8417 CALC BMI ABV UP PARAM F/U: HCPCS | Performed by: INTERNAL MEDICINE

## 2021-09-15 PROCEDURE — 1036F TOBACCO NON-USER: CPT | Performed by: INTERNAL MEDICINE

## 2021-09-15 RX ORDER — LINACLOTIDE 290 UG/1
1 CAPSULE, GELATIN COATED ORAL DAILY
Qty: 90 CAPSULE | Refills: 3 | Status: SHIPPED | OUTPATIENT
Start: 2021-09-15

## 2021-09-15 RX ORDER — FLUTICASONE FUROATE, UMECLIDINIUM BROMIDE AND VILANTEROL TRIFENATATE 200; 62.5; 25 UG/1; UG/1; UG/1
1 POWDER RESPIRATORY (INHALATION) 2 TIMES DAILY
Qty: 60 EACH | Refills: 5 | Status: SHIPPED | OUTPATIENT
Start: 2021-09-15

## 2021-09-15 RX ORDER — MONTELUKAST SODIUM 10 MG/1
TABLET ORAL
Qty: 30 TABLET | Refills: 4 | Status: SHIPPED | OUTPATIENT
Start: 2021-09-15

## 2021-09-15 RX ORDER — OMEPRAZOLE 20 MG/1
20 CAPSULE, DELAYED RELEASE ORAL DAILY
Qty: 90 CAPSULE | Refills: 3 | Status: SHIPPED | OUTPATIENT
Start: 2021-09-15

## 2021-09-15 RX ORDER — LORATADINE 10 MG/1
TABLET ORAL
Qty: 30 TABLET | Refills: 4 | Status: SHIPPED | OUTPATIENT
Start: 2021-09-15

## 2021-09-15 SDOH — ECONOMIC STABILITY: FOOD INSECURITY: WITHIN THE PAST 12 MONTHS, YOU WORRIED THAT YOUR FOOD WOULD RUN OUT BEFORE YOU GOT MONEY TO BUY MORE.: NEVER TRUE

## 2021-09-15 SDOH — ECONOMIC STABILITY: FOOD INSECURITY: WITHIN THE PAST 12 MONTHS, THE FOOD YOU BOUGHT JUST DIDN'T LAST AND YOU DIDN'T HAVE MONEY TO GET MORE.: NEVER TRUE

## 2021-09-15 ASSESSMENT — ENCOUNTER SYMPTOMS
EYE DISCHARGE: 0
RESPIRATORY NEGATIVE: 1
EYES NEGATIVE: 1

## 2021-09-15 ASSESSMENT — SOCIAL DETERMINANTS OF HEALTH (SDOH): HOW HARD IS IT FOR YOU TO PAY FOR THE VERY BASICS LIKE FOOD, HOUSING, MEDICAL CARE, AND HEATING?: NOT HARD AT ALL

## 2021-09-15 NOTE — ASSESSMENT & PLAN NOTE
Borderline controlled, continue current medications the patient discontinued Prozac on her own. She is requesting medication for anxiety. Follow-up with psychiatry suggested.

## 2021-09-15 NOTE — PROGRESS NOTES
Juan Carlos Baez (:  1990) is a 32 y.o. female,Established patient, here for evaluation of the following chief complaint(s): Other (follow up) and Discuss Medications    Patient is here for follow-up after over a year of being away because of Covid. She has had some problems with her asthma in the recent past so I am going to change her long-acting inhaler to Trelegy. Discontinue Advair. She is also in need of a psychiatry consultation regarding her anxiety. ASSESSMENT/PLAN:  1. Irritable bowel syndrome with both constipation and diarrhea  -     LINZESS 290 MCG CAPS capsule; Take 1 capsule by mouth daily, Disp-90 capsule, R-3, DAWNormal  2. Gastroesophageal reflux disease with esophagitis without hemorrhage  3. Elevated BP without diagnosis of hypertension  -     Comprehensive Metabolic Panel; Future  -     CBC Auto Differential; Future  -     TSH WITH REFLEX TO FT4; Future  -     Lipid Panel; Future  4. Moderate episode of recurrent major depressive disorder [facility])  Assessment & Plan:   Borderline controlled, continue current medications the patient discontinued Prozac on her own. She is requesting medication for anxiety. Follow-up with psychiatry suggested. Orders:  -     montelukast (SINGULAIR) 10 MG tablet; TAKE 1 TABLET BY MOUTH ONE TIME A DAY AT NIGHT, Disp-30 tablet, R-4Normal  5. Moderate persistent asthma without complication  -     Fluticasone-Umeclidin-Vilant (TRELEGY ELLIPTA) 200-62.5-25 MCG/INH AEPB; Inhale 1 puff into the lungs 2 times daily, Disp-60 each, R-5Normal  6. Allergic rhinitis, unspecified seasonality, unspecified trigger  -     loratadine (CLARITIN) 10 MG tablet; TAKE 1 TABLET BY MOUTH ONE TIME A DAY, Disp-30 tablet, R-4Normal  7. Gastroesophageal reflux disease with esophagitis  -     omeprazole (PRILOSEC) 20 MG delayed release capsule; Take 1 capsule by mouth daily, Disp-90 capsule, R-3Normal  8. Heel callus  -     AFL - Constance Ramirez, DPM, Podiatry, [place]  9. Migraine without aura and with status migrainosus, not intractable  10. Plantar wart, right foot  Assessment & Plan:   Uncontrolled, changes made today: Follow-up with the podiatrist suggested      Return in about 3 months (around 12/15/2021). Subjective   SUBJECTIVE/OBJECTIVE:  Other  This is a chronic (Asthma) problem. The current episode started more than 1 year ago. The problem has been gradually worsening. Pertinent negatives include no arthralgias or rash. Review of Systems   Constitutional: Negative for activity change and appetite change. HENT: Negative. Eyes: Negative. Negative for discharge. Respiratory: Negative. Genitourinary: Negative for difficulty urinating. Musculoskeletal: Negative for arthralgias. Skin: Negative for rash. Neurological: Negative. Psychiatric/Behavioral: Negative. Negative for agitation and confusion. The patient is not nervous/anxious. All other systems reviewed and are negative. Objective   Physical Exam  Constitutional:       Appearance: She is well-developed. HENT:      Head: Normocephalic and atraumatic. Eyes:      Conjunctiva/sclera: Conjunctivae normal.      Pupils: Pupils are equal, round, and reactive to light. Cardiovascular:      Rate and Rhythm: Normal rate and regular rhythm. Heart sounds: Normal heart sounds. Pulmonary:      Effort: Pulmonary effort is normal.      Breath sounds: Normal breath sounds. Musculoskeletal:         General: Normal range of motion. Cervical back: Normal range of motion and neck supple. Skin:     General: Skin is warm and dry. Neurological:      Mental Status: She is alert and oriented to person, place, and time. Deep Tendon Reflexes: Reflexes are normal and symmetric. Moderate episode of recurrent major depressive disorder ([address]   Borderline controlled, continue current medications the patient discontinued Prozac on her own.   She is requesting medication for anxiety. Follow-up with psychiatry suggested. Plantar wart, right foot   Uncontrolled, changes made today: Follow-up with the podiatrist suggested       On this date 9/15/2021 I have spent 45 minutes reviewing previous notes, test results and face to face with the patient discussing the diagnosis and importance of compliance with the treatment plan as well as documenting on the day of the visit. An electronic signature was used to authenticate this note.     --Wing Nikos MD

## 2021-09-15 NOTE — PATIENT INSTRUCTIONS
Please switch to Trelegy for improvement in your lung function. Discontinue the Advair. Try a gluten-free diet to help with control of your irritable bowel syndrome. A sample diet as listed below. Please see the podiatrist for the callus on your heel. Lab review as directed. Follow-up with GYN for Pap smear. Please proceed with your plans for a Covid vaccine. Realize the benefits of the vaccine far outweigh the risk associated with not getting vaccinated. Follow-up with me in 3 months.

## 2021-09-16 DIAGNOSIS — E78.49 OTHER HYPERLIPIDEMIA: Primary | ICD-10-CM

## 2021-09-16 RX ORDER — ROSUVASTATIN CALCIUM 20 MG/1
20 TABLET, COATED ORAL NIGHTLY
Qty: 90 TABLET | Refills: 3 | Status: SHIPPED | OUTPATIENT
Start: 2021-09-16 | End: 2021-11-04 | Stop reason: SDUPTHER

## 2021-11-04 ENCOUNTER — TELEPHONE (OUTPATIENT)
Dept: PRIMARY CARE CLINIC | Age: 31
End: 2021-11-04

## 2021-11-04 DIAGNOSIS — E78.49 OTHER HYPERLIPIDEMIA: ICD-10-CM

## 2021-11-04 RX ORDER — ROSUVASTATIN CALCIUM 20 MG/1
20 TABLET, COATED ORAL NIGHTLY
Qty: 90 TABLET | Refills: 3 | Status: SHIPPED | OUTPATIENT
Start: 2021-11-04

## 2021-11-04 NOTE — TELEPHONE ENCOUNTER
----- Message from ESTHELASHAKEEL LOOMIS [facility] sent at 11/4/2021 12:43 PM EDT -----  Subject: Medication Problem    QUESTIONS  Name of Medication? rosuvastatin (CRESTOR) 20 MG tablet  Patient-reported dosage and instructions? take one tablet at night , 20MG   What question or problem do you have with the medication? pt states that   while taking this medication she experiences severe headaches throughout   the night, but when stopping the medication for a week the headaches go   away, wants to have the Dr or the Nurse call her asap please advise thank   you   Preferred Pharmacy? [address]/ [facility] [address] [facility] [address] F [phone]  Pharmacy phone number (if available)? [phone]  Additional Information for Provider? pt would like to have something else   but pt does not want to take this medication any longer because of the   severe headaches please advise thank you   ---------------------------------------------------------------------------  --------------  CALL BACK INFO  What is the best way for the office to contact you? OK to leave message on   voicemail  Preferred Call Back Phone Number? [phone]  ---------------------------------------------------------------------------  --------------  SCRIPT ANSWERS  Relationship to Patient?  Self